# Patient Record
Sex: FEMALE | Race: WHITE | NOT HISPANIC OR LATINO
[De-identification: names, ages, dates, MRNs, and addresses within clinical notes are randomized per-mention and may not be internally consistent; named-entity substitution may affect disease eponyms.]

---

## 2019-05-17 ENCOUNTER — APPOINTMENT (OUTPATIENT)
Dept: ORTHOPEDIC SURGERY | Facility: CLINIC | Age: 54
End: 2019-05-17
Payer: COMMERCIAL

## 2019-05-17 VITALS — BODY MASS INDEX: 20.73 KG/M2 | HEIGHT: 69 IN | RESPIRATION RATE: 16 BRPM | WEIGHT: 140 LBS

## 2019-05-17 DIAGNOSIS — Z80.3 FAMILY HISTORY OF MALIGNANT NEOPLASM OF BREAST: ICD-10-CM

## 2019-05-17 DIAGNOSIS — Z78.9 OTHER SPECIFIED HEALTH STATUS: ICD-10-CM

## 2019-05-17 DIAGNOSIS — M72.0 PALMAR FASCIAL FIBROMATOSIS [DUPUYTREN]: ICD-10-CM

## 2019-05-17 PROBLEM — Z00.00 ENCOUNTER FOR PREVENTIVE HEALTH EXAMINATION: Status: ACTIVE | Noted: 2019-05-17

## 2019-05-17 PROCEDURE — 99203 OFFICE O/P NEW LOW 30 MIN: CPT

## 2019-05-17 PROCEDURE — 73130 X-RAY EXAM OF HAND: CPT | Mod: RT

## 2019-05-17 RX ORDER — UBIDECARENONE/VIT E ACET 100MG-5
CAPSULE ORAL
Refills: 0 | Status: ACTIVE | COMMUNITY

## 2019-05-17 RX ORDER — ERGOCALCIFEROL 1.25 MG/1
1.25 MG CAPSULE, LIQUID FILLED ORAL
Qty: 8 | Refills: 0 | Status: ACTIVE | COMMUNITY
Start: 2018-07-31

## 2019-05-17 RX ORDER — ALENDRONATE SODIUM 70 MG/1
70 TABLET ORAL
Qty: 8 | Refills: 0 | Status: ACTIVE | COMMUNITY
Start: 2018-12-10

## 2019-05-17 RX ORDER — CYCLOSPORINE 0.5 MG/ML
0.05 EMULSION OPHTHALMIC
Qty: 5 | Refills: 0 | Status: ACTIVE | COMMUNITY
Start: 2019-01-09

## 2019-05-17 RX ORDER — LETROZOLE 2.5 MG/1
TABLET, FILM COATED ORAL
Refills: 0 | Status: ACTIVE | COMMUNITY

## 2019-05-17 RX ORDER — AMOXICILLIN 500 MG/1
500 CAPSULE ORAL
Qty: 21 | Refills: 0 | Status: ACTIVE | COMMUNITY
Start: 2019-01-12

## 2019-05-17 RX ORDER — LETROZOLE TABLETS 2.5 MG/1
2.5 TABLET, FILM COATED ORAL
Qty: 30 | Refills: 0 | Status: ACTIVE | COMMUNITY
Start: 2019-03-11

## 2020-10-22 ENCOUNTER — APPOINTMENT (OUTPATIENT)
Dept: SURGERY | Facility: CLINIC | Age: 55
End: 2020-10-22

## 2020-11-12 ENCOUNTER — APPOINTMENT (OUTPATIENT)
Dept: UROLOGY | Facility: CLINIC | Age: 55
End: 2020-11-12
Payer: SELF-PAY

## 2020-11-12 VITALS
RESPIRATION RATE: 16 BRPM | HEART RATE: 89 BPM | SYSTOLIC BLOOD PRESSURE: 162 MMHG | TEMPERATURE: 97.2 F | HEIGHT: 69 IN | WEIGHT: 148 LBS | DIASTOLIC BLOOD PRESSURE: 83 MMHG | BODY MASS INDEX: 21.92 KG/M2

## 2020-11-12 DIAGNOSIS — N39.0 URINARY TRACT INFECTION, SITE NOT SPECIFIED: ICD-10-CM

## 2020-11-12 PROCEDURE — 99072 ADDL SUPL MATRL&STAF TM PHE: CPT

## 2020-11-12 PROCEDURE — 99204 OFFICE O/P NEW MOD 45 MIN: CPT

## 2020-11-12 NOTE — ASSESSMENT
[FreeTextEntry1] : 1.)  History of interstitial cystitis\par Continue Myrbetriq 25 mg p.o. daily and IC diet.\par Patient was given a sterile container and cleansing wipes to collect urine specimen at home if she should develop symptoms and bring it to a laboratory.  She was told to call the office for an antibiotic if this should happen.\par \par RTO 6 months for reevaluation.

## 2020-11-12 NOTE — HISTORY OF PRESENT ILLNESS
[FreeTextEntry1] : The patient is a 55-year-old woman who was seen in the office today for the above.  She explained that her urologist in the [place] diagnosed her with interstitial cystitis about 1 year ago.  She has been treated only with diet and Myrbetriq 25 mg p.o. daily.  She has no symptoms at this time.\par Her daytime frequency is 8 times a day with nocturia x2–4.  This pattern becomes worse with a flareup and she also develops suprapubic cramping, urinary burning and urgency.  He denies all other urological and constitutional symptomatology.\par \par Past Urological History: Negative\par \par Urological Family History: Negative

## 2020-11-12 NOTE — REVIEW OF SYSTEMS
[Negative] : Heme/Lymph [Eyesight Problems] : eyesight problems [Palpitations] : palpitations [Loss of interest] : loss of interest in sexual activity [Urine Infection (bladder/kidney)] : bladder/kidney infection [Told you have blood in urine on a urine test] : told blood was present in a urine test [Wake up at night to urinate  How many times?  ___] : wakes up to urinate [unfilled] times during the night [Slow urine stream] : slow urine stream [Interrupted urine stream] : interrupted urine stream

## 2020-11-12 NOTE — PHYSICAL EXAM
[General Appearance - Well Developed] : well developed [General Appearance - Well Nourished] : well nourished [Normal Appearance] : normal appearance [Well Groomed] : well groomed [General Appearance - In No Acute Distress] : no acute distress [Edema] : no peripheral edema [Respiration, Rhythm And Depth] : normal respiratory rhythm and effort [Exaggerated Use Of Accessory Muscles For Inspiration] : no accessory muscle use [Auscultation Breath Sounds / Voice Sounds] : lungs were clear to auscultation bilaterally [Bowel Sounds] : normal bowel sounds [Abdomen Soft] : soft [Abdomen Tenderness] : non-tender [Abdomen Mass (___ Cm)] : no abdominal mass palpated [Costovertebral Angle Tenderness] : no ~M costovertebral angle tenderness [Normal Station and Gait] : the gait and station were normal for the patient's age [] : no rash [No Focal Deficits] : no focal deficits [Oriented To Time, Place, And Person] : oriented to person, place, and time [Affect] : the affect was normal [Mood] : the mood was normal [Not Anxious] : not anxious [No Palpable Adenopathy] : no palpable adenopathy [FreeTextEntry1] : S1-S2 normal without murmur rub or gallop

## 2020-12-11 ENCOUNTER — TRANSCRIPTION ENCOUNTER (OUTPATIENT)
Age: 55
End: 2020-12-11

## 2020-12-23 PROBLEM — N39.0 ACUTE LOWER UTI: Status: RESOLVED | Noted: 2020-11-12 | Resolved: 2020-12-23

## 2020-12-26 ENCOUNTER — TRANSCRIPTION ENCOUNTER (OUTPATIENT)
Age: 55
End: 2020-12-26

## 2021-02-24 RX ORDER — NITROFURANTOIN (MONOHYDRATE/MACROCRYSTALS) 25; 75 MG/1; MG/1
100 CAPSULE ORAL
Qty: 10 | Refills: 0 | Status: ACTIVE | COMMUNITY
Start: 2021-02-24 | End: 1900-01-01

## 2021-02-26 LAB
APPEARANCE: CLEAR
BACTERIA UR CULT: NORMAL
BACTERIA: NEGATIVE
BILIRUBIN URINE: NEGATIVE
BLOOD URINE: NORMAL
COLOR: NORMAL
GLUCOSE QUALITATIVE U: NEGATIVE
HYALINE CASTS: 1 /LPF
KETONES URINE: NEGATIVE
LEUKOCYTE ESTERASE URINE: ABNORMAL
MICROSCOPIC-UA: NORMAL
NITRITE URINE: NEGATIVE
PH URINE: 7.5
PROTEIN URINE: NEGATIVE
RED BLOOD CELLS URINE: 1 /HPF
SPECIFIC GRAVITY URINE: 1.01
SQUAMOUS EPITHELIAL CELLS: 8 /HPF
UROBILINOGEN URINE: NORMAL
WHITE BLOOD CELLS URINE: 45 /HPF

## 2021-02-26 RX ORDER — NITROFURANTOIN (MONOHYDRATE/MACROCRYSTALS) 25; 75 MG/1; MG/1
100 CAPSULE ORAL TWICE DAILY
Qty: 14 | Refills: 0 | Status: ACTIVE | COMMUNITY
Start: 2021-02-26 | End: 1900-01-01

## 2021-03-10 ENCOUNTER — LABORATORY RESULT (OUTPATIENT)
Age: 56
End: 2021-03-10

## 2021-05-12 LAB — BACTERIA UR CULT: NORMAL

## 2021-05-13 ENCOUNTER — APPOINTMENT (OUTPATIENT)
Dept: UROLOGY | Facility: CLINIC | Age: 56
End: 2021-05-13

## 2021-05-21 ENCOUNTER — APPOINTMENT (OUTPATIENT)
Dept: OBGYN | Facility: CLINIC | Age: 56
End: 2021-05-21
Payer: COMMERCIAL

## 2021-05-21 VITALS
BODY MASS INDEX: 22.36 KG/M2 | HEIGHT: 69 IN | SYSTOLIC BLOOD PRESSURE: 110 MMHG | WEIGHT: 151 LBS | DIASTOLIC BLOOD PRESSURE: 72 MMHG

## 2021-05-21 DIAGNOSIS — Z78.0 ASYMPTOMATIC MENOPAUSAL STATE: ICD-10-CM

## 2021-05-21 PROCEDURE — 99386 PREV VISIT NEW AGE 40-64: CPT

## 2021-05-21 PROCEDURE — 99072 ADDL SUPL MATRL&STAF TM PHE: CPT

## 2021-05-21 PROCEDURE — 99213 OFFICE O/P EST LOW 20 MIN: CPT | Mod: 25

## 2021-05-21 NOTE — HISTORY OF PRESENT ILLNESS
[FreeTextEntry1] : This is a 55-year-old white female para 2 who presents as a new patient. She is postmenopausal and she does report vaginal dryness and irritation. She has a history of breast cancer and has had a bilateral mastectomy with implants. Patient's mother also had breast cancer. Patient developed a premenopausally and had negative genetic testing.\par \par Her medical history significant for breast cancer as well as osteopenia. She also has a history of interstitial cystitis that she was advised that developed after chemotherapy she is on no medication for this. Surgical history is significant for mastectomy with reconstruction. OB history significant for 2 vaginal deliveries.\par \par She is  and remarried. She denies alcohol tobacco drug use.\par \par Her oncologist follows her with annual breast exams and tumor markers. We'll also obtain a bone densities. She gets frequent colonoscopies as well.\par \par Patient moved here from the [place] and is currently not working.

## 2021-05-21 NOTE — PHYSICAL EXAM
[Appropriately responsive] : appropriately responsive [Alert] : alert [No Acute Distress] : no acute distress [No Lymphadenopathy] : no lymphadenopathy [Regular Rate Rhythm] : regular rate rhythm [No Murmurs] : no murmurs [Clear to Auscultation B/L] : clear to auscultation bilaterally [Soft] : soft [Non-tender] : non-tender [No HSM] : No HSM [Non-distended] : non-distended [No Lesions] : no lesions [No Mass] : no mass [Oriented x3] : oriented x3 [FreeTextEntry6] : implants b/l [Examination Of The Breasts] : a normal appearance [No Masses] : no breast masses were palpable [Labia Majora] : normal [Labia Minora] : normal [Normal] : normal [Uterine Adnexae] : normal [No Tenderness] : no tenderness [Nl Sphincter Tone] : normal sphincter tone [FreeTextEntry9] : spike neg

## 2021-05-21 NOTE — DISCUSSION/SUMMARY
[FreeTextEntry1] : Pap smear is performed. Regarding her chronic urinary symptoms we discussed considering vaginal estrogen creams. I have advised the patient does speak to her oncologist to get it cleared as she told me that in the past and denied if she could have it.

## 2021-05-22 LAB — HPV HIGH+LOW RISK DNA PNL CVX: NOT DETECTED

## 2021-05-25 LAB — CYTOLOGY CVX/VAG DOC THIN PREP: ABNORMAL

## 2021-05-27 ENCOUNTER — APPOINTMENT (OUTPATIENT)
Dept: UROLOGY | Facility: CLINIC | Age: 56
End: 2021-05-27
Payer: COMMERCIAL

## 2021-05-27 VITALS — TEMPERATURE: 98 F

## 2021-05-27 PROCEDURE — 99072 ADDL SUPL MATRL&STAF TM PHE: CPT

## 2021-05-27 PROCEDURE — 99214 OFFICE O/P EST MOD 30 MIN: CPT

## 2021-05-27 RX ORDER — HYOSCYAMINE SULFATE 0.12 MG/1
0.12 TABLET, ORALLY DISINTEGRATING ORAL
Qty: 60 | Refills: 2 | Status: ACTIVE | OUTPATIENT
Start: 2021-05-27

## 2021-05-27 RX ORDER — PENTOSAN POLYSULFATE SODIUM 100 MG/1
100 CAPSULE, GELATIN COATED ORAL
Qty: 270 | Refills: 2 | Status: ACTIVE | OUTPATIENT
Start: 2021-05-27

## 2021-05-27 NOTE — REVIEW OF SYSTEMS
[Eyesight Problems] : eyesight problems [Palpitations] : palpitations [Loss of interest] : loss of interest in sexual activity [Urine Infection (bladder/kidney)] : bladder/kidney infection [Told you have blood in urine on a urine test] : told blood was present in a urine test [Wake up at night to urinate  How many times?  ___] : wakes up to urinate [unfilled] times during the night [Slow urine stream] : slow urine stream [Interrupted urine stream] : interrupted urine stream [Negative] : Heme/Lymph

## 2021-05-27 NOTE — ASSESSMENT
[FreeTextEntry1] : 1.)  History of interstitial cystitis\par Discontinue Myrbetriq 25 mg p.o. daily. \par Trial of Levsin SL 0.125 mg, 1–2 tabs SL q. 4–6H as needed\par She was given a prescription for Elmiron and will consider taking this medication if her episodes increase.\par Continue IC diet.\par Patient will consider intravesical DMSO and call the office if she decides to proceed.\par Patient was given a sterile container and cleansing wipes to collect urine specimen at home if she should develop symptoms and bring it to a laboratory.  She was told to call the office for an antibiotic if this should happen.\par \par RTO 6 months for reevaluation.

## 2021-05-27 NOTE — HISTORY OF PRESENT ILLNESS
[FreeTextEntry1] : The patient is a 55-year-old woman who was seen in the office today for the above.  She explained that her urologist in the [place] diagnosed her with interstitial cystitis about 1 year ago.  She has been treated only with diet and Myrbetriq 25 mg p.o. daily.  She has no symptoms at this time.\par Her daytime frequency is 8 times a day with nocturia x2–4.  This pattern becomes worse with a flareup and she also develops suprapubic cramping, urinary burning and urgency.  He denies all other urological and constitutional symptomatology.  She continues to have episodes and has been taking the Myrbetriq as a as needed medication has not really been that effective.  He is concerned about lawsuits with Elmiron so she is reluctant to try this.\par \par Past Urological History: Negative\par \par Urological Family History: Negative

## 2021-06-15 ENCOUNTER — APPOINTMENT (OUTPATIENT)
Dept: OBGYN | Facility: CLINIC | Age: 56
End: 2021-06-15
Payer: COMMERCIAL

## 2021-06-15 VITALS
BODY MASS INDEX: 22.22 KG/M2 | DIASTOLIC BLOOD PRESSURE: 76 MMHG | SYSTOLIC BLOOD PRESSURE: 100 MMHG | WEIGHT: 150 LBS | HEIGHT: 69 IN

## 2021-06-15 PROCEDURE — 99072 ADDL SUPL MATRL&STAF TM PHE: CPT

## 2021-06-15 PROCEDURE — 99213 OFFICE O/P EST LOW 20 MIN: CPT

## 2021-06-15 RX ORDER — SULFAMETHOXAZOLE AND TRIMETHOPRIM 800; 160 MG/1; MG/1
800-160 TABLET ORAL TWICE DAILY
Qty: 6 | Refills: 1 | Status: ACTIVE | COMMUNITY
Start: 2021-06-15 | End: 1900-01-01

## 2021-06-15 NOTE — DISCUSSION/SUMMARY
[FreeTextEntry1] : Urine Cultures sent and I'm giving her Bactrim DS one tab p.o. b.i.d. for 3 days. Patient will call for culture results. If the culture is negative I have encouraged her to start her Elmiron.

## 2021-06-15 NOTE — HISTORY OF PRESENT ILLNESS
[FreeTextEntry1] : 55-year-old white female para 2 here for follow up visit. Patient was reporting some dysuria and urgency. She does have a history of interstitial cystitis and is not on medication for this as yet. However she believes this is a UTI as it usually has onset after intercourse.\par \par Patient has previously reported symptoms and we had discussed considering vaginal estrogen creams however she had estrogen-positive breast cancer and her oncologist does not approve.

## 2021-06-16 ENCOUNTER — TRANSCRIPTION ENCOUNTER (OUTPATIENT)
Age: 56
End: 2021-06-16

## 2021-06-21 LAB — BACTERIA UR CULT: ABNORMAL

## 2021-06-21 RX ORDER — CIPROFLOXACIN HYDROCHLORIDE 250 MG/1
250 TABLET, FILM COATED ORAL
Qty: 6 | Refills: 0 | Status: ACTIVE | COMMUNITY
Start: 2021-06-21 | End: 1900-01-01

## 2021-07-13 ENCOUNTER — APPOINTMENT (OUTPATIENT)
Dept: OBGYN | Facility: CLINIC | Age: 56
End: 2021-07-13
Payer: COMMERCIAL

## 2021-07-13 ENCOUNTER — ASOB RESULT (OUTPATIENT)
Age: 56
End: 2021-07-13

## 2021-07-13 VITALS — BODY MASS INDEX: 22.22 KG/M2 | WEIGHT: 150 LBS | HEIGHT: 69 IN

## 2021-07-13 DIAGNOSIS — N39.0 URINARY TRACT INFECTION, SITE NOT SPECIFIED: ICD-10-CM

## 2021-07-13 PROCEDURE — 76856 US EXAM PELVIC COMPLETE: CPT | Mod: 59

## 2021-07-13 PROCEDURE — 99072 ADDL SUPL MATRL&STAF TM PHE: CPT

## 2021-07-13 PROCEDURE — 99213 OFFICE O/P EST LOW 20 MIN: CPT

## 2021-07-13 PROCEDURE — 76830 TRANSVAGINAL US NON-OB: CPT

## 2021-07-13 NOTE — HISTORY OF PRESENT ILLNESS
[FreeTextEntry1] : 55-year-old white female para 2 here for followup visit. Patient is reporting some persistent dysuria and urgency. She did have a known urinary tract infection with Escherichia coli on urine culture done on 6 1521. She was originally treated with Bactrim DS but it was resistant and then switched to ciprofloxacin. She had some relief but not completely.\par \par The patient gives a history of interstitial cystitis but has not been treated with Elmiron. She also has a history of breast cancer and we had discussed considering vaginal estrogen creams but her oncologist does not approve.\par \par She also presents today for ultrasound and discussion. Ultrasound reveals a 5.7 x 3.4 cm uterus with a 2 mm endometrial thickness with a small amount of intracavitary fluid. Both adnexa are otherwise normal.

## 2021-07-16 LAB — BACTERIA UR CULT: NORMAL

## 2021-11-14 ENCOUNTER — NON-APPOINTMENT (OUTPATIENT)
Age: 56
End: 2021-11-14

## 2021-11-18 ENCOUNTER — APPOINTMENT (OUTPATIENT)
Dept: UROLOGY | Facility: CLINIC | Age: 56
End: 2021-11-18
Payer: COMMERCIAL

## 2021-11-18 VITALS
DIASTOLIC BLOOD PRESSURE: 81 MMHG | BODY MASS INDEX: 21.77 KG/M2 | HEART RATE: 71 BPM | RESPIRATION RATE: 14 BRPM | WEIGHT: 147 LBS | TEMPERATURE: 98.6 F | HEIGHT: 69 IN | SYSTOLIC BLOOD PRESSURE: 149 MMHG

## 2021-11-18 DIAGNOSIS — N30.10 INTERSTITIAL CYSTITIS (CHRONIC) W/OUT HEMATURIA: ICD-10-CM

## 2021-11-18 PROCEDURE — 99214 OFFICE O/P EST MOD 30 MIN: CPT

## 2021-11-18 NOTE — ASSESSMENT
[FreeTextEntry1] : 1.)  History of interstitial cystitis\par Discontinue Myrbetriq 25 mg p.o. daily. \par Trial of Levsin SL 0.125 mg, 1–2 tabs SL q. 4–6H as needed\par She was given a prescription for Elmiron and will consider taking this medication if her episodes increase.\par Continue IC diet.\par Patient will consider intravesical DMSO and call the office if she decides to proceed.\par Patient was given a sterile container and cleansing wipes to collect urine specimen at home if she should develop symptoms and bring it to a laboratory.  She was told to call the office for an antibiotic if this should happen.\par \par RTO 1 year for reevaluation as per patient request since she is feeling so well.

## 2021-11-18 NOTE — HISTORY OF PRESENT ILLNESS
[FreeTextEntry1] : The patient is a 56-year-old woman who was seen in the office today for the above.  She explained that her urologist in the [place] diagnosed her with interstitial cystitis about 1 year ago.\par At the last visit, there Myrbetriq was discontinued because it was not effective and she was treated with Levsin sublingual.  She has been following the IC diet and reports that she has not had to take the Levsin.  Is feeling well and her nocturia has decreased to once or twice a night with the daytime frequency of 6-7/day.  The suprapubic pressure is rare as is the urgency.\par   She denies all other urological and constitutional symptomatology. \par \par Past Urological History: Negative\par \par Urological Family History: Negative

## 2022-06-09 ENCOUNTER — APPOINTMENT (OUTPATIENT)
Dept: OBGYN | Facility: CLINIC | Age: 57
End: 2022-06-09
Payer: COMMERCIAL

## 2022-06-09 ENCOUNTER — NON-APPOINTMENT (OUTPATIENT)
Age: 57
End: 2022-06-09

## 2022-06-09 VITALS
SYSTOLIC BLOOD PRESSURE: 129 MMHG | HEIGHT: 69 IN | BODY MASS INDEX: 21.33 KG/M2 | DIASTOLIC BLOOD PRESSURE: 83 MMHG | WEIGHT: 144 LBS

## 2022-06-09 PROCEDURE — 99396 PREV VISIT EST AGE 40-64: CPT

## 2022-06-09 NOTE — HISTORY OF PRESENT ILLNESS
[FreeTextEntry1] : This is a 56-year-old white female para 2 who presents for annual visit. She is postmenopausal and she does report vaginal dryness and irritation. Pt's oncologist does not approve of vaginal estrogen. She has a history of breast cancer and has had a bilateral mastectomy with implants. Patient's mother also had breast cancer. Patient developed a premenopausally and had negative genetic testing in 2013.  She had some positive lymph nodes at the time of her diagnosis.\par \par She also has frequent uti's and i have on on keflex after intercourse and she is doing great with it.\par \par Her medical history significant for breast cancer as well as osteopenia currently on prolia. She also has a history of interstitial cystitis that she was advised that developed after chemotherapy she is on no medication for this. Surgical history is significant for mastectomy with reconstruction. OB history significant for 2 vaginal deliveries.\par \par She is  and remarried. She denies alcohol tobacco drug use.\par \par Her oncologist follows her with annual breast exams and tumor markers/pet scans. They also obtain a bone densities. 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

## 2022-06-09 NOTE — PHYSICAL EXAM
[Appropriately responsive] : appropriately responsive [Alert] : alert [No Acute Distress] : no acute distress [No Lymphadenopathy] : no lymphadenopathy [Regular Rate Rhythm] : regular rate rhythm [No Murmurs] : no murmurs [Clear to Auscultation B/L] : clear to auscultation bilaterally [Soft] : soft [Non-tender] : non-tender [Non-distended] : non-distended [No HSM] : No HSM [No Lesions] : no lesions [No Mass] : no mass [Oriented x3] : oriented x3 [FreeTextEntry6] : implants b/l [Examination Of The Breasts] : a normal appearance [No Masses] : no breast masses were palpable [Labia Majora] : normal [Labia Minora] : normal [Normal] : normal [Uterine Adnexae] : normal [No Tenderness] : no tenderness [Nl Sphincter Tone] : normal sphincter tone [FreeTextEntry9] : spike neg

## 2022-06-09 NOTE — DISCUSSION/SUMMARY
[FreeTextEntry1] : Pap smear is performed.  Regarding her genetic testing we discussed considering repeat genetic testing for newer mutations.\par \par In terms of her frequent UTIs I am refilling her Keflex as mentioned patient feels much better with this..

## 2022-06-12 LAB — HPV HIGH+LOW RISK DNA PNL CVX: NOT DETECTED

## 2022-06-15 LAB — CYTOLOGY CVX/VAG DOC THIN PREP: ABNORMAL

## 2022-07-06 ENCOUNTER — APPOINTMENT (OUTPATIENT)
Dept: OBGYN | Facility: CLINIC | Age: 57
End: 2022-07-06

## 2022-09-12 RX ORDER — SULFAMETHOXAZOLE AND TRIMETHOPRIM 800; 160 MG/1; MG/1
800-160 TABLET ORAL TWICE DAILY
Qty: 6 | Refills: 0 | Status: ACTIVE | COMMUNITY
Start: 2022-09-12 | End: 1900-01-01

## 2022-11-22 ENCOUNTER — APPOINTMENT (OUTPATIENT)
Dept: DERMATOLOGY | Facility: CLINIC | Age: 57
End: 2022-11-22

## 2022-11-22 PROCEDURE — 99203 OFFICE O/P NEW LOW 30 MIN: CPT

## 2022-12-16 ENCOUNTER — NON-APPOINTMENT (OUTPATIENT)
Age: 57
End: 2022-12-16

## 2023-01-03 ENCOUNTER — APPOINTMENT (OUTPATIENT)
Dept: DERMATOLOGY | Facility: CLINIC | Age: 58
End: 2023-01-03
Payer: COMMERCIAL

## 2023-01-03 PROCEDURE — 99214 OFFICE O/P EST MOD 30 MIN: CPT

## 2023-02-22 ENCOUNTER — APPOINTMENT (OUTPATIENT)
Dept: DERMATOLOGY | Facility: CLINIC | Age: 58
End: 2023-02-22
Payer: COMMERCIAL

## 2023-02-22 PROCEDURE — 99213 OFFICE O/P EST LOW 20 MIN: CPT

## 2023-06-20 ENCOUNTER — APPOINTMENT (OUTPATIENT)
Dept: OBGYN | Facility: CLINIC | Age: 58
End: 2023-06-20

## 2023-06-28 ENCOUNTER — APPOINTMENT (OUTPATIENT)
Dept: OBGYN | Facility: CLINIC | Age: 58
End: 2023-06-28

## 2023-07-13 ENCOUNTER — NON-APPOINTMENT (OUTPATIENT)
Age: 58
End: 2023-07-13

## 2023-07-13 ENCOUNTER — APPOINTMENT (OUTPATIENT)
Dept: OBGYN | Facility: CLINIC | Age: 58
End: 2023-07-13
Payer: COMMERCIAL

## 2023-07-13 VITALS
BODY MASS INDEX: 21.03 KG/M2 | WEIGHT: 142 LBS | DIASTOLIC BLOOD PRESSURE: 80 MMHG | HEIGHT: 69 IN | SYSTOLIC BLOOD PRESSURE: 124 MMHG

## 2023-07-13 DIAGNOSIS — Z01.419 ENCOUNTER FOR GYNECOLOGICAL EXAMINATION (GENERAL) (ROUTINE) W/OUT ABNORMAL FINDINGS: ICD-10-CM

## 2023-07-13 PROCEDURE — 99396 PREV VISIT EST AGE 40-64: CPT

## 2023-07-13 RX ORDER — CEPHALEXIN 500 MG/1
500 CAPSULE ORAL
Qty: 20 | Refills: 1 | Status: ACTIVE | COMMUNITY
Start: 2021-07-13 | End: 1900-01-01

## 2023-07-13 NOTE — DISCUSSION/SUMMARY
[FreeTextEntry1] : Pap smear is performed. \par \par In terms of her frequent UTIs I am refilling her Keflex as mentioned patient feels much better with this..\par \par Because of patient's history of breast cancer she will return for pelvic sonogram to assess her uterus and adnexa.

## 2023-07-13 NOTE — PHYSICAL EXAM
[Chaperone Present] : A chaperone was present in the examining room during all aspects of the physical examination [Appropriately responsive] : appropriately responsive [Alert] : alert [No Acute Distress] : no acute distress [No Lymphadenopathy] : no lymphadenopathy [Regular Rate Rhythm] : regular rate rhythm [No Murmurs] : no murmurs [Clear to Auscultation B/L] : clear to auscultation bilaterally [Soft] : soft [Non-tender] : non-tender [Non-distended] : non-distended [No HSM] : No HSM [No Lesions] : no lesions [No Mass] : no mass [Oriented x3] : oriented x3 [FreeTextEntry6] : implants b/l [Examination Of The Breasts] : a normal appearance [No Masses] : no breast masses were palpable [Labia Majora] : normal [Labia Minora] : normal [Uterine Prolapse] : uterine prolapse [Normal] : normal [Uterine Adnexae] : normal [No Tenderness] : no tenderness [Nl Sphincter Tone] : normal sphincter tone [FreeTextEntry9] : spike neg

## 2023-07-13 NOTE — HISTORY OF PRESENT ILLNESS
[FreeTextEntry1] : This is a 57-year-old white female para 2 who presents for annual visit. She is postmenopausal and she does report vaginal dryness and irritation. Pt's oncologist does not approve of vaginal estrogen. She has a history of breast cancer and has had a bilateral mastectomy with implants. Patient's mother also had breast cancer. Patient developed a premenopausally and had negative genetic testing in 2013.  She had some positive lymph nodes at the time of her diagnosis.\par \par She also has frequent uti's and i have on on keflex after intercourse and she is doing great with it.\par \par Her medical history significant for breast cancer as well as osteopenia currently on prolia. She also has a history of interstitial cystitis that she was advised that developed after chemotherapy she is on no medication for this. Surgical history is significant for mastectomy with reconstruction. OB history significant for 2 vaginal deliveries.\par \par She is  and remarried. She denies alcohol tobacco drug use.\par \par Her oncologist follows her with annual breast exams and tumor markers/pet scans. They also obtain a bone densities. 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

## 2023-07-16 LAB — HPV HIGH+LOW RISK DNA PNL CVX: NOT DETECTED

## 2023-07-18 LAB — CYTOLOGY CVX/VAG DOC THIN PREP: ABNORMAL

## 2023-08-09 ENCOUNTER — ASOB RESULT (OUTPATIENT)
Age: 58
End: 2023-08-09

## 2023-08-09 ENCOUNTER — APPOINTMENT (OUTPATIENT)
Dept: OBGYN | Facility: CLINIC | Age: 58
End: 2023-08-09
Payer: COMMERCIAL

## 2023-08-09 ENCOUNTER — APPOINTMENT (OUTPATIENT)
Dept: ANTEPARTUM | Facility: CLINIC | Age: 58
End: 2023-08-09
Payer: COMMERCIAL

## 2023-08-09 VITALS — BODY MASS INDEX: 21.03 KG/M2 | WEIGHT: 142 LBS | HEIGHT: 69 IN

## 2023-08-09 DIAGNOSIS — C50.919 MALIGNANT NEOPLASM OF UNSPECIFIED SITE OF UNSPECIFIED FEMALE BREAST: ICD-10-CM

## 2023-08-09 PROCEDURE — 76856 US EXAM PELVIC COMPLETE: CPT | Mod: 59

## 2023-08-09 PROCEDURE — 99213 OFFICE O/P EST LOW 20 MIN: CPT

## 2023-08-09 PROCEDURE — 76830 TRANSVAGINAL US NON-OB: CPT

## 2023-08-09 NOTE — DISCUSSION/SUMMARY
[FreeTextEntry1] : I discussed the findings of the sonogram with the patient.  I did discuss that they did not suggest any abnormal endometrial thickening and additionally both her adnexa appeared normal.  I discussed history of breast cancer increasing risk of ovarian cancer as well as they both have similar risk factors.  Additionally as mentioned patient had been on tamoxifen.  All her questions were answered.

## 2023-08-09 NOTE — HISTORY OF PRESENT ILLNESS
[FreeTextEntry1] : 57-year-old white female para 2 here for follow-up visit.  Patient has history of breast cancer for which she had been on tamoxifen and is now on letrozole.  She presents today for screening pelvic ultrasound.  She denies any vaginal bleeding or discomfort.  Ultrasound today suggests a 4.9 x 3.8 cm uterus with a 2.64 mm endometrium and normal adnexa bilaterally.

## 2023-09-26 ENCOUNTER — APPOINTMENT (OUTPATIENT)
Dept: DERMATOLOGY | Facility: CLINIC | Age: 58
End: 2023-09-26

## 2023-10-25 ENCOUNTER — APPOINTMENT (OUTPATIENT)
Dept: DERMATOLOGY | Facility: CLINIC | Age: 58
End: 2023-10-25
Payer: COMMERCIAL

## 2023-10-25 PROCEDURE — 99213 OFFICE O/P EST LOW 20 MIN: CPT

## 2023-11-29 ENCOUNTER — APPOINTMENT (OUTPATIENT)
Dept: DERMATOLOGY | Facility: CLINIC | Age: 58
End: 2023-11-29
Payer: COMMERCIAL

## 2023-11-29 PROCEDURE — 99213 OFFICE O/P EST LOW 20 MIN: CPT

## 2024-01-02 DIAGNOSIS — M79.642 PAIN IN RIGHT HAND: ICD-10-CM

## 2024-01-02 DIAGNOSIS — M79.641 PAIN IN RIGHT HAND: ICD-10-CM

## 2024-01-03 ENCOUNTER — APPOINTMENT (OUTPATIENT)
Dept: ORTHOPEDIC SURGERY | Facility: CLINIC | Age: 59
End: 2024-01-03
Payer: COMMERCIAL

## 2024-01-03 DIAGNOSIS — M79.642 PAIN IN LEFT HAND: ICD-10-CM

## 2024-01-03 PROCEDURE — 99204 OFFICE O/P NEW MOD 45 MIN: CPT

## 2024-01-03 PROCEDURE — 73130 X-RAY EXAM OF HAND: CPT | Mod: 50

## 2024-01-03 NOTE — PHYSICAL EXAM
[de-identified] : Examination of the left hand reveals palmar contraction palpable cord extending onto the left ring finger with MCP contracture of 10 degrees and PIP contracture of 45 degrees noncorrectable.  Positive tabletop. Left hand palmar cord extends onto the base of the left middle MCP as well with 10 degree contracture [de-identified] : [4] views of [bilateral hands and wrists] were obtained today in my office and were seen by me and discussed with the patient.  These [show findings consistent with bilateral basal joint OA and findings of IP joint OA]

## 2024-01-03 NOTE — HISTORY OF PRESENT ILLNESS
[FreeTextEntry1] : Latoya is a very pleasant 58-year-old female who presents today with a long history of Dupuytren's contracture she had surgery on the contralateral side in the past however presents with left-sided symptoms and findings

## 2024-01-03 NOTE — ASSESSMENT
[FreeTextEntry1] : ASSESSMENT: The patient comes in today with chronic exacerbated symptoms of Dupuytren's disease with contracture.  She has had surgery on the contralateral side in the past.  She would like surgery for the left side.  We have discussed postoperative rehabilitation risk of recurrence and stiffness. Dupuytren's contracture release surgery   At this point the patient comes in with significant contracture of their hand and fingers.  We discussed complete fasciectomy as well as partial fasciectomy.  The patient's goal is to achieve as much extension as possible.  We discussed joint stiffness and the likelihood that the patient will need joint releases at the time of surgery with subsequent need for physical therapy and or joint pinning in the acute setting.  We discussed complications including numbness, vascular injury, vascular stretching due to chronic scarring.  We discussed possibility of need for amputation if the finger does not pink up.   We discussed the possibility of this condition returning and/or affecting other digits as a flareup.  We discussed the need for therapy to optimize his outcome.  This is just as important as the surgery itself.  They verbalized complete understanding and willingness to proceed.   Surgical Consent Discussion:   I explained the risks and benefits of surgical intervention to the patient. The risks include, but are not limited to: pain, infection, swelling, stiffness, injury to underlying neurovascular structures, scar sensitivity, incomplete resolution of symptoms, the possibility of the need for future surgery, CRPS, and finally the need to comply with a post-operative occupational therapy protocol. The patient understands, agrees and informed consent was obtained. The patients surgery will be scheduled in the near future.   The patient was adequately and thoroughly informed of my assessment of their current condition(s).  - This may diminish bodily function for the extremity. We discussed prognosis, tx modalities including operative and nonoperative options for the above diagnostic assessment. As always, 2nd opinion is always provided as an option.  When accessible, I was able to review other physicians note(s) including reviewing other tests, imaging results as well as personally view these results for my own interpretation.   The patient was adequately and thoroughly informed of my assessment of their current condition(s).  DISCUSSION: 1.  She elects to proceed with left hand palmar fasciectomy release of middle and ring fingers including PIP of ring finger 2. [x] 3. [x]

## 2024-02-22 ENCOUNTER — APPOINTMENT (OUTPATIENT)
Dept: DERMATOLOGY | Facility: CLINIC | Age: 59
End: 2024-02-22
Payer: COMMERCIAL

## 2024-02-22 PROCEDURE — 99213 OFFICE O/P EST LOW 20 MIN: CPT

## 2024-03-06 ENCOUNTER — APPOINTMENT (OUTPATIENT)
Dept: ORTHOPEDIC SURGERY | Facility: CLINIC | Age: 59
End: 2024-03-06

## 2024-03-08 RX ORDER — MELOXICAM 15 MG/1
15 TABLET ORAL DAILY
Qty: 14 | Refills: 0 | Status: ACTIVE | COMMUNITY
Start: 2024-03-08 | End: 1900-01-01

## 2024-03-10 PROBLEM — N30.10 CHRONIC INTERSTITIAL CYSTITIS: Status: ACTIVE | Noted: 2020-11-12

## 2024-03-11 ENCOUNTER — APPOINTMENT (OUTPATIENT)
Dept: ORTHOPEDIC SURGERY | Facility: AMBULATORY SURGERY CENTER | Age: 59
End: 2024-03-11
Payer: COMMERCIAL

## 2024-03-11 PROCEDURE — 26123 RELEASE PALM CONTRACTURE: CPT | Mod: F2

## 2024-03-11 PROCEDURE — 26125 RELEASE PALM CONTRACTURE: CPT | Mod: F3

## 2024-03-18 ENCOUNTER — APPOINTMENT (OUTPATIENT)
Dept: ORTHOPEDIC SURGERY | Facility: CLINIC | Age: 59
End: 2024-03-18
Payer: COMMERCIAL

## 2024-03-18 PROCEDURE — 99024 POSTOP FOLLOW-UP VISIT: CPT

## 2024-03-18 NOTE — HISTORY OF PRESENT ILLNESS
[___ Weeks Post Op] : [unfilled] weeks post op [de-identified] : POS: left hand palmar fasciectomy release of middle and ring fingers including ring interphalangeal joint release.  DOS: 03/11/2024 [de-identified] : Returns for follow up. [de-identified] : The incisions are healing beautifully. There are no signs of infection. Passively I am able to extend both her left middle and left ring fingers to neutral. [de-identified] : We are both delighted with the results. Commence OT to further optimize outcome. [de-identified] : 1. Commence OT. Follow up in 6 weeks.

## 2024-04-30 ENCOUNTER — APPOINTMENT (OUTPATIENT)
Dept: ORTHOPEDIC SURGERY | Facility: CLINIC | Age: 59
End: 2024-04-30
Payer: COMMERCIAL

## 2024-04-30 DIAGNOSIS — M79.646 PAIN IN UNSPECIFIED HAND: ICD-10-CM

## 2024-04-30 DIAGNOSIS — M72.0 PALMAR FASCIAL FIBROMATOSIS [DUPUYTREN]: ICD-10-CM

## 2024-04-30 DIAGNOSIS — M79.643 PAIN IN UNSPECIFIED HAND: ICD-10-CM

## 2024-04-30 PROCEDURE — 99024 POSTOP FOLLOW-UP VISIT: CPT

## 2024-04-30 NOTE — HISTORY OF PRESENT ILLNESS
[de-identified] : POS: left hand palmar fasciectomy release of middle and ring fingers including ring interphalangeal joint release.  DOS: 03/11/2024. [de-identified] : Returns for follow up. [de-identified] : The incisions have healed beautifully. She is able to both passively and actively extend all fingers of the left hand to neutral.  [de-identified] : We are both delighted with the results. Continue HEP to further optimize outcome. [de-identified] : 1. Continue HEP. Follow up as needed.

## 2024-06-06 ENCOUNTER — APPOINTMENT (OUTPATIENT)
Dept: PODIATRY | Facility: CLINIC | Age: 59
End: 2024-06-06
Payer: COMMERCIAL

## 2024-06-06 VITALS — BODY MASS INDEX: 21.03 KG/M2 | HEIGHT: 69 IN | WEIGHT: 142 LBS

## 2024-06-06 DIAGNOSIS — M79.671 PAIN IN RIGHT FOOT: ICD-10-CM

## 2024-06-06 DIAGNOSIS — M20.41 OTHER HAMMER TOE(S) (ACQUIRED), RIGHT FOOT: ICD-10-CM

## 2024-06-06 PROCEDURE — 73620 X-RAY EXAM OF FOOT: CPT | Mod: RT

## 2024-06-06 PROCEDURE — 99214 OFFICE O/P EST MOD 30 MIN: CPT

## 2024-06-10 PROBLEM — M79.671 RIGHT FOOT PAIN: Status: ACTIVE | Noted: 2024-06-10

## 2024-06-10 PROBLEM — M20.41 HAMMER TOE OF RIGHT FOOT: Status: ACTIVE | Noted: 2024-06-10

## 2024-06-10 NOTE — PHYSICAL EXAM
----- Message from Colin Arenas MD sent at 4/14/2021  9:53 PM CDT -----  LP showed improved TG and HDL but still not at goal.   LDL is at goal.   Continue current medications.  Continue to eat healthy and stay active.   [Ankle Swelling (On Exam)] : not present [Varicose Veins Of Lower Extremities] : not present [Delayed in the Right Toes] : capillary refills normal in right toes [Delayed in the Left Toes] : capillary refills normal in the left toes [2+] : left foot dorsalis pedis 2+ [TextEntry] : There is a contracture of the right second toe with redness overlying the PIPJ.  Mild swelling is noted.

## 2024-06-10 NOTE — HISTORY OF PRESENT ILLNESS
[FreeTextEntry1] : Latoya Bob returns stating that she is really having problems with the right second toe and thinks she wants to have surgery on it.  She thinks she would like to do it in the fall.  She states that it is hurting in most of her closed shoes no matter what she wears.  She states that she had a similar procedure on the left 2nd toe and now thinks she is getting ready to do the right side.

## 2024-06-10 NOTE — ASSESSMENT
[FreeTextEntry1] : Assessment: Hammertoe deformity, right second toe.  Plan:   DP and lateral weightbearing radiographs were taken of the right foot, which showed contracture of the right second toe.  I discussed surgical correction with the patient including the procedures involved, the anesthesia that will be used, the pre-operative tests and medical clearance.  I discussed the post-op care and convalescence in detail including using a walker and a cast.  I explained that the patient would be otherwise non-ambulatory. I advised the patient that it would take at least 2 months before the patient would be able to get back into loose sneakers. I explained the risks and possible complications including infection, delayed healing, excessive prolonged swelling, possible additional surgery and recurrence, with the patient along with various alternative conservative therapies including the use of orthotics, physical therapy, oral anti-inflammatory medication, and changing shoe gear.  The patient asked questions that were satisfactorily answered.  She will return to see Dr. Jimenez for a surgical consult.

## 2024-07-01 ENCOUNTER — NON-APPOINTMENT (OUTPATIENT)
Age: 59
End: 2024-07-01

## 2024-07-18 ENCOUNTER — APPOINTMENT (OUTPATIENT)
Dept: DERMATOLOGY | Facility: CLINIC | Age: 59
End: 2024-07-18
Payer: COMMERCIAL

## 2024-07-18 PROCEDURE — 99213 OFFICE O/P EST LOW 20 MIN: CPT

## 2024-08-14 ENCOUNTER — APPOINTMENT (OUTPATIENT)
Dept: OBGYN | Facility: CLINIC | Age: 59
End: 2024-08-14

## 2024-08-27 ENCOUNTER — APPOINTMENT (OUTPATIENT)
Dept: OBGYN | Facility: CLINIC | Age: 59
End: 2024-08-27
Payer: COMMERCIAL

## 2024-08-27 VITALS
SYSTOLIC BLOOD PRESSURE: 132 MMHG | WEIGHT: 142 LBS | DIASTOLIC BLOOD PRESSURE: 81 MMHG | BODY MASS INDEX: 21.03 KG/M2 | HEIGHT: 69 IN | HEART RATE: 71 BPM

## 2024-08-27 DIAGNOSIS — Z85.3 PERSONAL HISTORY OF MALIGNANT NEOPLASM OF BREAST: ICD-10-CM

## 2024-08-27 DIAGNOSIS — Z13.820 ENCOUNTER FOR SCREENING FOR OSTEOPOROSIS: ICD-10-CM

## 2024-08-27 PROCEDURE — 99396 PREV VISIT EST AGE 40-64: CPT

## 2024-08-27 PROCEDURE — 99459 PELVIC EXAMINATION: CPT

## 2024-08-27 NOTE — HISTORY OF PRESENT ILLNESS
[FreeTextEntry1] : This is a 59-year-old white female para 2 who presents for annual visit. She is postmenopausal and she does report vaginal dryness and irritation. Pt's oncologist does not approve of vaginal estrogen. She has a history of breast cancer and has had a bilateral mastectomy with implants. Patient's mother also had breast cancer. Patient developed a premenopausally and had negative genetic testing in 2013.  She had some positive lymph nodes at the time of her diagnosis.  She also has frequent uti's and i have on on keflex after intercourse and she is doing great with it.  Her medical history significant for breast cancer as well as osteopenia currently on prolia. She also has a history of interstitial cystitis that she was advised that developed after chemotherapy she is on no medication for this. Surgical history is significant for mastectomy with reconstruction. OB history significant for 2 vaginal deliveries.  She is  and remarried. She denies alcohol tobacco drug use.  Her oncologist follows her with annual breast exams and tumor markers/pet scans. They also obtain a bone densities. She gets frequent colonoscopies as well.  Patient moved here from the [place] and is now working at a school as a sub.

## 2024-08-27 NOTE — PHYSICAL EXAM
[Chaperone Present] : A chaperone was present in the examining room during all aspects of the physical examination [[id number]] : A chaperone was present during the pelvic exam. [FreeTextEntry2] : leticia moreno [Appropriately responsive] : appropriately responsive [Alert] : alert [No Acute Distress] : no acute distress [No Lymphadenopathy] : no lymphadenopathy [Regular Rate Rhythm] : regular rate rhythm [No Murmurs] : no murmurs [Clear to Auscultation B/L] : clear to auscultation bilaterally [Soft] : soft [Non-tender] : non-tender [Non-distended] : non-distended [No HSM] : No HSM [No Lesions] : no lesions [No Mass] : no mass [Oriented x3] : oriented x3 [FreeTextEntry6] : implants b/l [Examination Of The Breasts] : a normal appearance [No Masses] : no breast masses were palpable [Labia Majora] : normal [Labia Minora] : normal [Uterine Prolapse] : uterine prolapse [Normal] : normal [Uterine Adnexae] : normal [No Tenderness] : no tenderness [Nl Sphincter Tone] : normal sphincter tone [FreeTextEntry9] : spike neg

## 2024-08-27 NOTE — DISCUSSION/SUMMARY
[FreeTextEntry1] : Pap smear is performed.  Because of patient's history of breast cancer she will return for pelvic sonogram to assess her uterus and adnexa.

## 2024-08-29 LAB — HPV HIGH+LOW RISK DNA PNL CVX: NOT DETECTED

## 2024-09-01 LAB — CYTOLOGY CVX/VAG DOC THIN PREP: ABNORMAL

## 2024-09-09 ENCOUNTER — APPOINTMENT (OUTPATIENT)
Dept: PODIATRY | Facility: CLINIC | Age: 59
End: 2024-09-09
Payer: COMMERCIAL

## 2024-09-09 VITALS — BODY MASS INDEX: 21.03 KG/M2 | HEIGHT: 69 IN | WEIGHT: 142 LBS

## 2024-09-09 DIAGNOSIS — M79.671 PAIN IN RIGHT FOOT: ICD-10-CM

## 2024-09-09 DIAGNOSIS — M20.41 OTHER HAMMER TOE(S) (ACQUIRED), RIGHT FOOT: ICD-10-CM

## 2024-09-09 DIAGNOSIS — M20.42 OTHER HAMMER TOE(S) (ACQUIRED), LEFT FOOT: ICD-10-CM

## 2024-09-09 PROCEDURE — 99214 OFFICE O/P EST MOD 30 MIN: CPT

## 2024-09-09 PROCEDURE — 73620 X-RAY EXAM OF FOOT: CPT | Mod: LT

## 2024-09-09 NOTE — PHYSICAL EXAM
[General Appearance - Alert] : alert [General Appearance - In No Acute Distress] : in no acute distress [2+] : left foot dorsalis pedis 2+ [Reverse Phalen's Test Both Wrists] : negative bilateral [Milan's Test For Radial Artery Patency Bilaterally] : negative bilateral [de-identified] : tenderness to palpation of dorsal PIPJ and DIPJ of the right 2nd and 3rd toes. There is visible hammertoe at PIPJ deformity of Right 2nd and DIPJ deformity of Right 3rd. There is bowstring visible extensor tendons of bilateral toes. There is fat pad atrophy with mild tenderness submet 2/3. There is dorsal exostosis of first MTPJ There is no painful ROM first MTPJ/STJ/Ankle joint. ROM preserved.  [Skin Lesions] : no skin lesions [Foot Ulcer] : no foot ulcer [Sensation] : the sensory exam was normal to light touch and pinprick

## 2024-09-09 NOTE — HISTORY OF PRESENT ILLNESS
[FreeTextEntry1] : 59 year old female presents for surgical consult for right 2nd and third painful hammertoes as well as left 2nd and 3rd painful hammertoes. She previously had left 2nd/3rd hammertoe surgery 5+ years ago. States the she had reoccurence of her hammertoes of her left foot over the last few years. Other than that the surgery was uneventful. She admits history of HTN and Breast cancer on remission receiving prolia injections. Admits 5/10+ pain to Right 2nd/3rd hammertoes. she has tried silicon toe sleeves and taller toebox shoes without improvement. She wishes to have surgical treatment. Patient denies any additional complaints.

## 2024-09-09 NOTE — ASSESSMENT
[FreeTextEntry1] : Painful hammertoes 2/3 of bilateral feet. Hx of Previous Left 2nd toe arthroplasty with reoccurence  Left foot xrays obtained: Hammering toes 2-5 2nd previous arthroplasty performed and 2nd toe with recurrent hammering of toe. No increased HAV. There is a tailor bunion deformity. Prominent navicular Mild talar uncovering no fracture dislocation   Advised to continue conservative treatment silicon toe sleeves tall toebox shoes, crest pads. We discussed injection therapy for temporary relief as well   We discussed the preoperative expectations, as well as operation to be performed, and postoperative expectations. We discussed risks, benefits and options with risks to include, but not limited to, delayed healing, postoperative infection, chronic swelling and numbness, as well as under and over correction of the deformity, permanent numbness, stiffness, chronic pain, as well as a need for further surgery.   We discussed anesthesia to be utilized and expectations postoperatively of limited or non-weight-bearing activities, use of crutches and/or walker, elevation and follow-up visits. An in office consent was obtained by the patient and witnessed by Staff members. Each line of the consent was read to the patient including but not limited to operation details, post operative protocol and complications of surgery. We discussed obtaining pre surgical testing and clearance. Patient demonstrated understanding and signed and dated the consent.    Plan for Right foot first Right 2nd/3rd toe arthrodesis, 2nd MTPJ capsulotomy possible weil osteotomy, possible extensor lengthening May need akin hallux   PTR after surgery

## 2024-09-24 ENCOUNTER — ASOB RESULT (OUTPATIENT)
Age: 59
End: 2024-09-24

## 2024-09-24 ENCOUNTER — APPOINTMENT (OUTPATIENT)
Dept: ANTEPARTUM | Facility: CLINIC | Age: 59
End: 2024-09-24
Payer: COMMERCIAL

## 2024-09-24 ENCOUNTER — APPOINTMENT (OUTPATIENT)
Age: 59
End: 2024-09-24
Payer: COMMERCIAL

## 2024-09-24 VITALS
BODY MASS INDEX: 21.03 KG/M2 | SYSTOLIC BLOOD PRESSURE: 140 MMHG | HEIGHT: 69 IN | DIASTOLIC BLOOD PRESSURE: 84 MMHG | WEIGHT: 142 LBS | HEART RATE: 73 BPM

## 2024-09-24 DIAGNOSIS — Z85.3 PERSONAL HISTORY OF MALIGNANT NEOPLASM OF BREAST: ICD-10-CM

## 2024-09-24 PROCEDURE — 76830 TRANSVAGINAL US NON-OB: CPT

## 2024-09-24 PROCEDURE — 99213 OFFICE O/P EST LOW 20 MIN: CPT

## 2024-09-24 PROCEDURE — 76856 US EXAM PELVIC COMPLETE: CPT | Mod: 59

## 2024-09-24 NOTE — HISTORY OF PRESENT ILLNESS
[FreeTextEntry1] : 59-year-old white female para 2 here for follow-up visit.  Patient has history of breast cancer for which she has had bilateral mastectomy with implants.  She presents today for screening pelvic ultrasound.  She denies any vaginal bleeding.  Patient is also here to follow-up on her genetic testing.  Patient had genetic testing in 2013 and then says it was repeated in 2019 but her oncologist office has not located the report from 2019.  Ultrasound today reveals a 5.7 x 3.2 cm uterus with a 4.8 mm endometrium with some nonvascular cystic changes measuring 2.3 mm.  Both adnexa are essentially normal.  Unfortunately patient is distraught today because her 85-year-old father passed away last week from an MI after a fall.Patient's mother has dementia.

## 2024-09-24 NOTE — DISCUSSION/SUMMARY
[FreeTextEntry1] : I discussed the findings of the sonogram with her.  I discussed some cystic changes in the endometrium but patient denies any vaginal bleeding.  We discussed following her expectantly and repeating the sonogram in a few months.  If the situation is worse we will consider an endometrial biopsy.

## 2024-10-19 ENCOUNTER — OFFICE (OUTPATIENT)
Dept: URBAN - METROPOLITAN AREA CLINIC 105 | Facility: CLINIC | Age: 59
Setting detail: OPHTHALMOLOGY
End: 2024-10-19
Payer: COMMERCIAL

## 2024-10-19 ENCOUNTER — RX ONLY (RX ONLY)
Age: 59
End: 2024-10-19

## 2024-10-19 DIAGNOSIS — H16.223: ICD-10-CM

## 2024-10-19 DIAGNOSIS — H52.13: ICD-10-CM

## 2024-10-19 PROCEDURE — 92004 COMPRE OPH EXAM NEW PT 1/>: CPT | Performed by: OPTOMETRIST

## 2024-10-19 PROCEDURE — 92015 DETERMINE REFRACTIVE STATE: CPT | Performed by: OPTOMETRIST

## 2024-10-19 ASSESSMENT — KERATOMETRY
OD_K2POWER_DIOPTERS: 46.50
OS_AXISANGLE_DEGREES: 113
OS_K1POWER_DIOPTERS: 45.25
OS_K2POWER_DIOPTERS: 45.75
OD_AXISANGLE_DEGREES: 079
OD_K1POWER_DIOPTERS: 45.00

## 2024-10-19 ASSESSMENT — REFRACTION_AUTOREFRACTION
OS_AXIS: 031
OS_SPHERE: -3.50
OD_AXIS: 173
OD_CYLINDER: -1.00
OS_CYLINDER: -0.50
OD_SPHERE: -3.00

## 2024-10-19 ASSESSMENT — REFRACTION_MANIFEST
OD_AXIS: 175
OD_SPHERE: -3.25
OD_VA1: 20/20
OS_ADD: +2.25
OS_CYLINDER: -0.50
OS_VA1: 20/20
OD_CYLINDER: -0.75
OS_SPHERE: -3.75
OS_AXIS: 020
OD_ADD: +2.25

## 2024-10-19 ASSESSMENT — SUPERFICIAL PUNCTATE KERATITIS (SPK)
OS_SPK: ABSENT
OD_SPK: ABSENT

## 2024-10-19 ASSESSMENT — TONOMETRY
OD_IOP_MMHG: 16
OS_IOP_MMHG: 16

## 2024-10-19 ASSESSMENT — VISUAL ACUITY
OS_BCVA: 20/20-2
OD_BCVA: 20/30-

## 2024-10-19 ASSESSMENT — CONFRONTATIONAL VISUAL FIELD TEST (CVF)
OD_FINDINGS: FULL
OS_FINDINGS: FULL

## 2024-11-14 ENCOUNTER — NON-APPOINTMENT (OUTPATIENT)
Age: 59
End: 2024-11-14

## 2024-11-18 ENCOUNTER — APPOINTMENT (OUTPATIENT)
Dept: DERMATOLOGY | Facility: CLINIC | Age: 59
End: 2024-11-18

## 2024-11-23 ENCOUNTER — OFFICE (OUTPATIENT)
Dept: URBAN - METROPOLITAN AREA CLINIC 113 | Facility: CLINIC | Age: 59
Setting detail: OPHTHALMOLOGY
End: 2024-11-23
Payer: COMMERCIAL

## 2024-11-23 DIAGNOSIS — Z01.00: ICD-10-CM

## 2024-11-23 PROCEDURE — 92310-21 PREMIUM/SPECIALTY EXTENDED WEAR FIT: Performed by: OPTOMETRIST

## 2024-11-23 ASSESSMENT — SUPERFICIAL PUNCTATE KERATITIS (SPK)
OD_SPK: ABSENT
OS_SPK: ABSENT

## 2024-11-23 ASSESSMENT — CONFRONTATIONAL VISUAL FIELD TEST (CVF)
OD_FINDINGS: FULL
OS_FINDINGS: FULL

## 2024-11-25 ASSESSMENT — REFRACTION_MANIFEST
OS_VA1: 20/20
OD_VA1: 20/20
OD_CYLINDER: -0.75
OS_CYLINDER: -0.50
OS_SPHERE: -3.75
OD_SPHERE: -3.25
OS_AXIS: 020
OD_AXIS: 175
OD_ADD: +2.25
OS_ADD: +2.25

## 2024-11-25 ASSESSMENT — VISUAL ACUITY
OD_BCVA: 20/25
OS_BCVA: 20/20

## 2024-11-25 ASSESSMENT — REFRACTION_AUTOREFRACTION
OS_SPHERE: -3.50
OS_CYLINDER: -0.50
OS_AXIS: 031
OD_CYLINDER: -1.00
OD_AXIS: 173
OD_SPHERE: -3.00

## 2024-11-25 ASSESSMENT — KERATOMETRY
OS_K1POWER_DIOPTERS: 45.25
OD_AXISANGLE_DEGREES: 079
OD_K2POWER_DIOPTERS: 46.50
OD_K1POWER_DIOPTERS: 45.00
OS_K2POWER_DIOPTERS: 45.75
OS_AXISANGLE_DEGREES: 113

## 2024-12-06 ENCOUNTER — APPOINTMENT (OUTPATIENT)
Dept: PODIATRY | Facility: CLINIC | Age: 59
End: 2024-12-06

## 2024-12-17 ENCOUNTER — APPOINTMENT (OUTPATIENT)
Facility: HOSPITAL | Age: 59
End: 2024-12-17

## 2025-01-09 ENCOUNTER — APPOINTMENT (OUTPATIENT)
Dept: ORTHOPEDIC SURGERY | Facility: CLINIC | Age: 60
End: 2025-01-09
Payer: COMMERCIAL

## 2025-01-09 VITALS — BODY MASS INDEX: 20.73 KG/M2 | WEIGHT: 140 LBS | HEIGHT: 69 IN

## 2025-01-09 DIAGNOSIS — M20.41 OTHER HAMMER TOE(S) (ACQUIRED), RIGHT FOOT: ICD-10-CM

## 2025-01-09 DIAGNOSIS — Z86.79 PERSONAL HISTORY OF OTHER DISEASES OF THE CIRCULATORY SYSTEM: ICD-10-CM

## 2025-01-09 PROCEDURE — 99203 OFFICE O/P NEW LOW 30 MIN: CPT

## 2025-01-20 ENCOUNTER — RX ONLY (RX ONLY)
Age: 60
End: 2025-01-20

## 2025-01-20 ENCOUNTER — OFFICE (OUTPATIENT)
Dept: URBAN - METROPOLITAN AREA CLINIC 113 | Facility: CLINIC | Age: 60
Setting detail: OPHTHALMOLOGY
End: 2025-01-20
Payer: COMMERCIAL

## 2025-01-20 DIAGNOSIS — H01.001: ICD-10-CM

## 2025-01-20 DIAGNOSIS — H16.223: ICD-10-CM

## 2025-01-20 DIAGNOSIS — H01.004: ICD-10-CM

## 2025-01-20 DIAGNOSIS — H02.825: ICD-10-CM

## 2025-01-20 PROCEDURE — 99213 OFFICE O/P EST LOW 20 MIN: CPT | Performed by: STUDENT IN AN ORGANIZED HEALTH CARE EDUCATION/TRAINING PROGRAM

## 2025-01-20 ASSESSMENT — REFRACTION_MANIFEST
OD_VA1: 20/20
OS_AXIS: 020
OD_CYLINDER: -0.75
OD_ADD: +2.25
OD_AXIS: 175
OS_SPHERE: -3.75
OS_VA1: 20/20
OS_CYLINDER: -0.50
OD_SPHERE: -3.25
OS_ADD: +2.25

## 2025-01-20 ASSESSMENT — KERATOMETRY
OS_K1POWER_DIOPTERS: 45.25
OS_AXISANGLE_DEGREES: 113
OS_K2POWER_DIOPTERS: 45.75
OD_AXISANGLE_DEGREES: 079
OD_K2POWER_DIOPTERS: 46.50
OD_K1POWER_DIOPTERS: 45.00

## 2025-01-20 ASSESSMENT — REFRACTION_AUTOREFRACTION
OS_SPHERE: -3.50
OD_SPHERE: -3.00
OS_CYLINDER: -0.50
OS_AXIS: 031
OD_AXIS: 173
OD_CYLINDER: -1.00

## 2025-01-20 ASSESSMENT — VISUAL ACUITY
OD_BCVA: 20/20
OS_BCVA: 20/25

## 2025-01-20 ASSESSMENT — SUPERFICIAL PUNCTATE KERATITIS (SPK)
OS_SPK: T
OD_SPK: T

## 2025-01-20 ASSESSMENT — LID EXAM ASSESSMENTS
OD_BLEPHARITIS: RUL 1+
OS_BLEPHARITIS: LUL 1+

## 2025-03-06 ENCOUNTER — OFFICE (OUTPATIENT)
Dept: URBAN - METROPOLITAN AREA CLINIC 105 | Facility: CLINIC | Age: 60
Setting detail: OPHTHALMOLOGY
End: 2025-03-06
Payer: COMMERCIAL

## 2025-03-06 DIAGNOSIS — H01.004: ICD-10-CM

## 2025-03-06 DIAGNOSIS — H16.223: ICD-10-CM

## 2025-03-06 DIAGNOSIS — H02.825: ICD-10-CM

## 2025-03-06 DIAGNOSIS — H01.001: ICD-10-CM

## 2025-03-06 PROCEDURE — 99213 OFFICE O/P EST LOW 20 MIN: CPT | Performed by: OPHTHALMOLOGY

## 2025-03-06 ASSESSMENT — SUPERFICIAL PUNCTATE KERATITIS (SPK)
OS_SPK: T
OD_SPK: T

## 2025-03-06 ASSESSMENT — LID EXAM ASSESSMENTS
OD_BLEPHARITIS: RUL 1+
OS_BLEPHARITIS: LUL 1+

## 2025-03-06 ASSESSMENT — CONFRONTATIONAL VISUAL FIELD TEST (CVF)
OS_FINDINGS: FULL
OD_FINDINGS: FULL

## 2025-03-07 ASSESSMENT — REFRACTION_MANIFEST
OS_AXIS: 020
OS_CYLINDER: -0.50
OD_VA1: 20/20
OS_ADD: +2.25
OD_AXIS: 175
OD_SPHERE: -3.25
OD_CYLINDER: -0.75
OS_VA1: 20/20
OS_SPHERE: -3.75
OD_ADD: +2.25

## 2025-03-07 ASSESSMENT — REFRACTION_AUTOREFRACTION
OD_AXIS: 173
OS_SPHERE: -3.50
OD_CYLINDER: -1.00
OD_SPHERE: -3.00
OS_AXIS: 031
OS_CYLINDER: -0.50

## 2025-03-07 ASSESSMENT — KERATOMETRY
OS_K2POWER_DIOPTERS: 45.75
OS_AXISANGLE_DEGREES: 113
OD_K2POWER_DIOPTERS: 46.50
OD_K1POWER_DIOPTERS: 45.00
OD_AXISANGLE_DEGREES: 079
OS_K1POWER_DIOPTERS: 45.25

## 2025-03-07 ASSESSMENT — VISUAL ACUITY
OS_BCVA: 20/25
OD_BCVA: 20/20

## 2025-03-27 ENCOUNTER — OFFICE (OUTPATIENT)
Dept: URBAN - METROPOLITAN AREA CLINIC 113 | Facility: CLINIC | Age: 60
Setting detail: OPHTHALMOLOGY
End: 2025-03-27
Payer: COMMERCIAL

## 2025-03-27 DIAGNOSIS — H01.001: ICD-10-CM

## 2025-03-27 DIAGNOSIS — H16.223: ICD-10-CM

## 2025-03-27 DIAGNOSIS — H02.825: ICD-10-CM

## 2025-03-27 DIAGNOSIS — H01.004: ICD-10-CM

## 2025-03-27 PROCEDURE — 92012 INTRM OPH EXAM EST PATIENT: CPT | Mod: 25 | Performed by: OPHTHALMOLOGY

## 2025-03-27 PROCEDURE — 67810 INCAL BX EYELID SKN LID MRGN: CPT | Mod: E2 | Performed by: OPHTHALMOLOGY

## 2025-03-27 PROCEDURE — 67840 REMOVE EYELID LESION: CPT | Mod: 51,E2 | Performed by: OPHTHALMOLOGY

## 2025-03-27 PROCEDURE — 92285 EXTERNAL OCULAR PHOTOGRAPHY: CPT | Performed by: OPHTHALMOLOGY

## 2025-03-27 ASSESSMENT — LID EXAM ASSESSMENTS
OS_BLEPHARITIS: LUL 1+
OD_BLEPHARITIS: RUL 1+

## 2025-03-27 ASSESSMENT — TONOMETRY
OS_IOP_MMHG: 12
OD_IOP_MMHG: 13

## 2025-03-27 ASSESSMENT — SUPERFICIAL PUNCTATE KERATITIS (SPK)
OD_SPK: T
OS_SPK: T

## 2025-03-28 ENCOUNTER — RX ONLY (RX ONLY)
Age: 60
End: 2025-03-28

## 2025-03-28 ASSESSMENT — REFRACTION_MANIFEST
OS_CYLINDER: -0.50
OD_AXIS: 175
OD_SPHERE: -3.25
OS_ADD: +2.25
OS_VA1: 20/20
OS_SPHERE: -3.75
OD_ADD: +2.25
OS_AXIS: 020
OD_VA1: 20/20
OD_CYLINDER: -0.75

## 2025-03-28 ASSESSMENT — KERATOMETRY
OS_K1POWER_DIOPTERS: 45.25
OD_K1POWER_DIOPTERS: 45.00
OS_AXISANGLE_DEGREES: 113
OD_K2POWER_DIOPTERS: 46.50
OS_K2POWER_DIOPTERS: 45.75
OD_AXISANGLE_DEGREES: 079

## 2025-03-28 ASSESSMENT — REFRACTION_AUTOREFRACTION
OD_CYLINDER: -1.00
OS_AXIS: 031
OD_SPHERE: -3.00
OD_AXIS: 173
OS_CYLINDER: -0.50
OS_SPHERE: -3.50

## 2025-03-28 ASSESSMENT — VISUAL ACUITY
OD_BCVA: 20/20
OS_BCVA: 20/20-1

## 2025-04-08 ENCOUNTER — NON-APPOINTMENT (OUTPATIENT)
Age: 60
End: 2025-04-08

## 2025-04-10 ENCOUNTER — APPOINTMENT (OUTPATIENT)
Dept: ANTEPARTUM | Facility: CLINIC | Age: 60
End: 2025-04-10
Payer: COMMERCIAL

## 2025-04-10 ENCOUNTER — APPOINTMENT (OUTPATIENT)
Age: 60
End: 2025-04-10
Payer: COMMERCIAL

## 2025-04-10 ENCOUNTER — ASOB RESULT (OUTPATIENT)
Age: 60
End: 2025-04-10

## 2025-04-10 VITALS
HEIGHT: 69 IN | WEIGHT: 140 LBS | BODY MASS INDEX: 20.73 KG/M2 | DIASTOLIC BLOOD PRESSURE: 90 MMHG | SYSTOLIC BLOOD PRESSURE: 148 MMHG

## 2025-04-10 DIAGNOSIS — R93.89 ABNORMAL FINDINGS ON DIAGNOSTIC IMAGING OF OTHER SPECIFIED BODY STRUCTURES: ICD-10-CM

## 2025-04-10 PROCEDURE — 99213 OFFICE O/P EST LOW 20 MIN: CPT

## 2025-04-10 PROCEDURE — 76856 US EXAM PELVIC COMPLETE: CPT | Mod: 59

## 2025-04-10 PROCEDURE — 76830 TRANSVAGINAL US NON-OB: CPT

## 2025-04-10 RX ORDER — MISOPROSTOL 200 UG/1
200 TABLET ORAL
Qty: 2 | Refills: 0 | Status: ACTIVE | COMMUNITY
Start: 2025-04-10 | End: 1900-01-01

## 2025-04-15 ENCOUNTER — OFFICE (OUTPATIENT)
Dept: URBAN - METROPOLITAN AREA CLINIC 94 | Facility: CLINIC | Age: 60
Setting detail: OPHTHALMOLOGY
End: 2025-04-15
Payer: COMMERCIAL

## 2025-04-15 DIAGNOSIS — H02.825: ICD-10-CM

## 2025-04-15 DIAGNOSIS — H01.001: ICD-10-CM

## 2025-04-15 DIAGNOSIS — H16.223: ICD-10-CM

## 2025-04-15 DIAGNOSIS — H01.004: ICD-10-CM

## 2025-04-15 PROCEDURE — 92012 INTRM OPH EXAM EST PATIENT: CPT | Performed by: OPHTHALMOLOGY

## 2025-04-15 ASSESSMENT — TONOMETRY
OS_IOP_MMHG: 10
OD_IOP_MMHG: 14

## 2025-04-15 ASSESSMENT — LID EXAM ASSESSMENTS
OS_BLEPHARITIS: LUL 1+
OD_BLEPHARITIS: RUL 1+

## 2025-04-15 ASSESSMENT — KERATOMETRY
OS_K1POWER_DIOPTERS: 45.25
OD_K2POWER_DIOPTERS: 46.50
OS_AXISANGLE_DEGREES: 113
OD_AXISANGLE_DEGREES: 079
OS_K2POWER_DIOPTERS: 45.75
OD_K1POWER_DIOPTERS: 45.00

## 2025-04-15 ASSESSMENT — CONFRONTATIONAL VISUAL FIELD TEST (CVF)
OS_FINDINGS: FULL
OD_FINDINGS: FULL

## 2025-04-15 ASSESSMENT — SUPERFICIAL PUNCTATE KERATITIS (SPK)
OD_SPK: T
OS_SPK: T

## 2025-04-15 ASSESSMENT — REFRACTION_MANIFEST
OS_VA1: 20/20
OS_ADD: +2.25
OD_ADD: +2.25
OD_VA1: 20/20
OS_AXIS: 020
OS_SPHERE: -3.75
OD_AXIS: 175
OD_CYLINDER: -0.75
OS_CYLINDER: -0.50
OD_SPHERE: -3.25

## 2025-04-15 ASSESSMENT — REFRACTION_AUTOREFRACTION
OS_CYLINDER: -0.50
OS_AXIS: 031
OD_SPHERE: -3.00
OD_CYLINDER: -1.00
OD_AXIS: 173
OS_SPHERE: -3.50

## 2025-04-15 ASSESSMENT — VISUAL ACUITY
OS_BCVA: 20/20-1
OD_BCVA: 20/20

## 2025-05-07 ENCOUNTER — APPOINTMENT (OUTPATIENT)
Age: 60
End: 2025-05-07
Payer: COMMERCIAL

## 2025-05-07 VITALS
HEIGHT: 69 IN | WEIGHT: 138 LBS | BODY MASS INDEX: 20.44 KG/M2 | SYSTOLIC BLOOD PRESSURE: 134 MMHG | DIASTOLIC BLOOD PRESSURE: 84 MMHG

## 2025-05-07 DIAGNOSIS — R93.89 ABNORMAL FINDINGS ON DIAGNOSTIC IMAGING OF OTHER SPECIFIED BODY STRUCTURES: ICD-10-CM

## 2025-05-07 PROCEDURE — 99213 OFFICE O/P EST LOW 20 MIN: CPT | Mod: 25

## 2025-05-07 PROCEDURE — 58558Z: CUSTOM

## 2025-05-14 LAB — CORE LAB BIOPSY: NORMAL

## 2025-05-15 ENCOUNTER — APPOINTMENT (OUTPATIENT)
Age: 60
End: 2025-05-15
Payer: COMMERCIAL

## 2025-05-15 VITALS
WEIGHT: 138 LBS | DIASTOLIC BLOOD PRESSURE: 98 MMHG | HEIGHT: 69 IN | SYSTOLIC BLOOD PRESSURE: 130 MMHG | BODY MASS INDEX: 20.44 KG/M2

## 2025-05-15 DIAGNOSIS — N93.9 ABNORMAL UTERINE AND VAGINAL BLEEDING, UNSPECIFIED: ICD-10-CM

## 2025-05-15 PROCEDURE — 99213 OFFICE O/P EST LOW 20 MIN: CPT

## 2025-06-10 ENCOUNTER — APPOINTMENT (OUTPATIENT)
Dept: PODIATRY | Facility: CLINIC | Age: 60
End: 2025-06-10
Payer: COMMERCIAL

## 2025-06-10 PROCEDURE — 99214 OFFICE O/P EST MOD 30 MIN: CPT

## 2025-06-12 PROBLEM — B35.1 ONYCHOMYCOSIS OF TOENAIL: Status: ACTIVE | Noted: 2025-06-12

## 2025-06-25 ENCOUNTER — APPOINTMENT (OUTPATIENT)
Dept: OBGYN | Facility: CLINIC | Age: 60
End: 2025-06-25

## 2025-09-02 ENCOUNTER — APPOINTMENT (OUTPATIENT)
Dept: OBGYN | Facility: CLINIC | Age: 60
End: 2025-09-02
Payer: COMMERCIAL

## 2025-09-02 VITALS
HEIGHT: 69 IN | BODY MASS INDEX: 20.29 KG/M2 | HEART RATE: 62 BPM | SYSTOLIC BLOOD PRESSURE: 145 MMHG | WEIGHT: 137 LBS | DIASTOLIC BLOOD PRESSURE: 85 MMHG

## 2025-09-02 DIAGNOSIS — N30.10 INTERSTITIAL CYSTITIS (CHRONIC) W/OUT HEMATURIA: ICD-10-CM

## 2025-09-02 DIAGNOSIS — N95.2 POSTMENOPAUSAL ATROPHIC VAGINITIS: ICD-10-CM

## 2025-09-02 DIAGNOSIS — R93.89 ABNORMAL FINDINGS ON DIAGNOSTIC IMAGING OF OTHER SPECIFIED BODY STRUCTURES: ICD-10-CM

## 2025-09-02 DIAGNOSIS — Z01.419 ENCOUNTER FOR GYNECOLOGICAL EXAMINATION (GENERAL) (ROUTINE) W/OUT ABNORMAL FINDINGS: ICD-10-CM

## 2025-09-02 PROCEDURE — 99396 PREV VISIT EST AGE 40-64: CPT

## 2025-09-02 PROCEDURE — 99214 OFFICE O/P EST MOD 30 MIN: CPT | Mod: 25

## 2025-09-02 PROCEDURE — 99459 PELVIC EXAMINATION: CPT

## 2025-09-03 LAB — HPV HIGH+LOW RISK DNA PNL CVX: NOT DETECTED

## 2025-09-08 LAB — CYTOLOGY CVX/VAG DOC THIN PREP: ABNORMAL
